# Patient Record
Sex: MALE | Race: WHITE | Employment: FULL TIME | URBAN - METROPOLITAN AREA
[De-identification: names, ages, dates, MRNs, and addresses within clinical notes are randomized per-mention and may not be internally consistent; named-entity substitution may affect disease eponyms.]

---

## 2021-03-22 ENCOUNTER — APPOINTMENT (OUTPATIENT)
Dept: CT IMAGING | Age: 36
DRG: 179 | End: 2021-03-22
Attending: EMERGENCY MEDICINE
Payer: COMMERCIAL

## 2021-03-22 ENCOUNTER — APPOINTMENT (OUTPATIENT)
Dept: GENERAL RADIOLOGY | Age: 36
DRG: 179 | End: 2021-03-22
Attending: EMERGENCY MEDICINE
Payer: COMMERCIAL

## 2021-03-22 ENCOUNTER — HOSPITAL ENCOUNTER (INPATIENT)
Age: 36
LOS: 2 days | Discharge: HOME | DRG: 179 | End: 2021-03-24
Attending: EMERGENCY MEDICINE | Admitting: HOSPITALIST
Payer: COMMERCIAL

## 2021-03-22 DIAGNOSIS — R07.9 CHEST PAIN, UNSPECIFIED TYPE: ICD-10-CM

## 2021-03-22 DIAGNOSIS — U07.1 COVID-19: ICD-10-CM

## 2021-03-22 DIAGNOSIS — R06.02 SHORTNESS OF BREATH: Primary | ICD-10-CM

## 2021-03-22 PROBLEM — R09.02 HYPOXIA: Status: ACTIVE | Noted: 2021-03-22

## 2021-03-22 PROBLEM — R06.00 DYSPNEA: Status: ACTIVE | Noted: 2021-03-22

## 2021-03-22 LAB
ALBUMIN SERPL-MCNC: 4.1 G/DL (ref 3.4–5)
ALBUMIN/GLOB SERPL: 1.3 {RATIO} (ref 0.8–1.7)
ALP SERPL-CCNC: 113 U/L (ref 45–117)
ALT SERPL-CCNC: 92 U/L (ref 16–61)
ANION GAP SERPL CALC-SCNC: 8 MMOL/L (ref 3–18)
AST SERPL-CCNC: 35 U/L (ref 10–38)
BASOPHILS # BLD: 0 K/UL (ref 0–0.1)
BASOPHILS NFR BLD: 0 % (ref 0–2)
BILIRUB SERPL-MCNC: 0.9 MG/DL (ref 0.2–1)
BUN SERPL-MCNC: 13 MG/DL (ref 7–18)
BUN/CREAT SERPL: 10 (ref 12–20)
CALCIUM SERPL-MCNC: 9.4 MG/DL (ref 8.5–10.1)
CHLORIDE SERPL-SCNC: 107 MMOL/L (ref 100–111)
CK MB CFR SERPL CALC: NORMAL % (ref 0–4)
CK MB SERPL-MCNC: <1 NG/ML (ref 5–25)
CK SERPL-CCNC: 63 U/L (ref 39–308)
CO2 SERPL-SCNC: 25 MMOL/L (ref 21–32)
CREAT SERPL-MCNC: 1.35 MG/DL (ref 0.6–1.3)
CRP SERPL-MCNC: <0.3 MG/DL (ref 0–0.3)
D DIMER PPP FEU-MCNC: <0.27 UG/ML(FEU)
DIFFERENTIAL METHOD BLD: ABNORMAL
EOSINOPHIL # BLD: 0 K/UL (ref 0–0.4)
EOSINOPHIL NFR BLD: 1 % (ref 0–5)
ERYTHROCYTE [DISTWIDTH] IN BLOOD BY AUTOMATED COUNT: 13 % (ref 11.6–14.5)
GLOBULIN SER CALC-MCNC: 3.1 G/DL (ref 2–4)
GLUCOSE SERPL-MCNC: 97 MG/DL (ref 74–99)
HCT VFR BLD AUTO: 44.2 % (ref 36–48)
HGB BLD-MCNC: 14.5 G/DL (ref 13–16)
LYMPHOCYTES # BLD: 2.1 K/UL (ref 0.9–3.6)
LYMPHOCYTES NFR BLD: 32 % (ref 21–52)
MAGNESIUM SERPL-MCNC: 2.3 MG/DL (ref 1.6–2.6)
MCH RBC QN AUTO: 28 PG (ref 24–34)
MCHC RBC AUTO-ENTMCNC: 32.8 G/DL (ref 31–37)
MCV RBC AUTO: 85.5 FL (ref 74–97)
MONOCYTES # BLD: 0.8 K/UL (ref 0.05–1.2)
MONOCYTES NFR BLD: 12 % (ref 3–10)
NEUTS SEG # BLD: 3.7 K/UL (ref 1.8–8)
NEUTS SEG NFR BLD: 55 % (ref 40–73)
PLATELET # BLD AUTO: 320 K/UL (ref 135–420)
PMV BLD AUTO: 9.7 FL (ref 9.2–11.8)
POTASSIUM SERPL-SCNC: 3.9 MMOL/L (ref 3.5–5.5)
PROCALCITONIN SERPL-MCNC: <0.05 NG/ML
PROT SERPL-MCNC: 7.2 G/DL (ref 6.4–8.2)
RBC # BLD AUTO: 5.17 M/UL (ref 4.7–5.5)
SODIUM SERPL-SCNC: 140 MMOL/L (ref 136–145)
TROPONIN I SERPL-MCNC: <0.02 NG/ML (ref 0–0.04)
WBC # BLD AUTO: 6.7 K/UL (ref 4.6–13.2)

## 2021-03-22 PROCEDURE — 71275 CT ANGIOGRAPHY CHEST: CPT

## 2021-03-22 PROCEDURE — 84145 PROCALCITONIN (PCT): CPT

## 2021-03-22 PROCEDURE — 85025 COMPLETE CBC W/AUTO DIFF WBC: CPT

## 2021-03-22 PROCEDURE — 83735 ASSAY OF MAGNESIUM: CPT

## 2021-03-22 PROCEDURE — 74011000258 HC RX REV CODE- 258: Performed by: HOSPITALIST

## 2021-03-22 PROCEDURE — 99285 EMERGENCY DEPT VISIT HI MDM: CPT

## 2021-03-22 PROCEDURE — 74011250637 HC RX REV CODE- 250/637: Performed by: HOSPITALIST

## 2021-03-22 PROCEDURE — 77010033678 HC OXYGEN DAILY

## 2021-03-22 PROCEDURE — XW033E5 INTRODUCTION OF REMDESIVIR ANTI-INFECTIVE INTO PERIPHERAL VEIN, PERCUTANEOUS APPROACH, NEW TECHNOLOGY GROUP 5: ICD-10-PCS | Performed by: HOSPITALIST

## 2021-03-22 PROCEDURE — 80053 COMPREHEN METABOLIC PANEL: CPT

## 2021-03-22 PROCEDURE — 74011250636 HC RX REV CODE- 250/636: Performed by: EMERGENCY MEDICINE

## 2021-03-22 PROCEDURE — 86140 C-REACTIVE PROTEIN: CPT

## 2021-03-22 PROCEDURE — 74011000250 HC RX REV CODE- 250: Performed by: HOSPITALIST

## 2021-03-22 PROCEDURE — 82553 CREATINE MB FRACTION: CPT

## 2021-03-22 PROCEDURE — 74011000636 HC RX REV CODE- 636: Performed by: HOSPITALIST

## 2021-03-22 PROCEDURE — 71045 X-RAY EXAM CHEST 1 VIEW: CPT

## 2021-03-22 PROCEDURE — 74011250636 HC RX REV CODE- 250/636: Performed by: HOSPITALIST

## 2021-03-22 PROCEDURE — 65660000000 HC RM CCU STEPDOWN

## 2021-03-22 PROCEDURE — 85379 FIBRIN DEGRADATION QUANT: CPT

## 2021-03-22 PROCEDURE — 93005 ELECTROCARDIOGRAM TRACING: CPT

## 2021-03-22 RX ORDER — DEXAMETHASONE 4 MG/1
6 TABLET ORAL DAILY
Status: DISCONTINUED | OUTPATIENT
Start: 2021-03-22 | End: 2021-03-24 | Stop reason: HOSPADM

## 2021-03-22 RX ORDER — ENOXAPARIN SODIUM 100 MG/ML
30 INJECTION SUBCUTANEOUS EVERY 12 HOURS
Status: DISCONTINUED | OUTPATIENT
Start: 2021-03-22 | End: 2021-03-24 | Stop reason: HOSPADM

## 2021-03-22 RX ORDER — SODIUM CHLORIDE 9 MG/ML
250 INJECTION, SOLUTION INTRAVENOUS AS NEEDED
Status: DISCONTINUED | OUTPATIENT
Start: 2021-03-22 | End: 2021-03-24 | Stop reason: HOSPADM

## 2021-03-22 RX ORDER — MELATONIN
2000 DAILY
Status: DISCONTINUED | OUTPATIENT
Start: 2021-03-22 | End: 2021-03-24 | Stop reason: HOSPADM

## 2021-03-22 RX ORDER — CALCIUM CARB/MAGNESIUM CARB 311-232MG
5 TABLET ORAL
Status: DISCONTINUED | OUTPATIENT
Start: 2021-03-22 | End: 2021-03-24 | Stop reason: HOSPADM

## 2021-03-22 RX ORDER — ZINC SULFATE 50(220)MG
1 CAPSULE ORAL DAILY
Status: DISCONTINUED | OUTPATIENT
Start: 2021-03-23 | End: 2021-03-24 | Stop reason: HOSPADM

## 2021-03-22 RX ORDER — ASCORBIC ACID 250 MG
500 TABLET ORAL DAILY
Status: DISCONTINUED | OUTPATIENT
Start: 2021-03-23 | End: 2021-03-24 | Stop reason: HOSPADM

## 2021-03-22 RX ORDER — DIPHENHYDRAMINE HYDROCHLORIDE 50 MG/ML
50 INJECTION, SOLUTION INTRAMUSCULAR; INTRAVENOUS
Status: COMPLETED | OUTPATIENT
Start: 2021-03-22 | End: 2021-03-22

## 2021-03-22 RX ADMIN — DEXAMETHASONE 6 MG: 4 TABLET ORAL at 21:29

## 2021-03-22 RX ADMIN — ENOXAPARIN SODIUM 30 MG: 30 INJECTION SUBCUTANEOUS at 21:30

## 2021-03-22 RX ADMIN — DIPHENHYDRAMINE HYDROCHLORIDE 50 MG: 50 INJECTION, SOLUTION INTRAMUSCULAR; INTRAVENOUS at 14:40

## 2021-03-22 RX ADMIN — Medication 2000 UNITS: at 21:30

## 2021-03-22 RX ADMIN — IOPAMIDOL 100 ML: 755 INJECTION, SOLUTION INTRAVENOUS at 15:46

## 2021-03-22 RX ADMIN — SODIUM CHLORIDE 1000 ML: 900 INJECTION, SOLUTION INTRAVENOUS at 14:40

## 2021-03-22 RX ADMIN — REMDESIVIR 200 MG: 100 INJECTION, POWDER, LYOPHILIZED, FOR SOLUTION INTRAVENOUS at 21:30

## 2021-03-22 RX ADMIN — Medication 5 MG: at 21:30

## 2021-03-22 RX ADMIN — METHYLPREDNISOLONE SODIUM SUCCINATE 125 MG: 125 INJECTION, POWDER, FOR SOLUTION INTRAMUSCULAR; INTRAVENOUS at 14:40

## 2021-03-22 NOTE — ED NOTES
TRANSFER - OUT REPORT:    Verbal report given to PIERCE Beaulieu (name) on Pedro Curtis  being transferred to Tele (unit) for routine progression of care       Report consisted of patient’s Situation, Background, Assessment and   Recommendations(SBAR).     Information from the following report(s) SBAR, ED Summary, MAR, Accordion, Recent Results and Cardiac Rhythm Normal Sinus Rhythm  was reviewed with the receiving nurse.    Lines:   Peripheral IV Anterior;Left Forearm (Active)   Site Assessment Clean, dry, & intact 03/22/21 1045   Phlebitis Assessment 0 03/22/21 1045   Dressing Status Clean, dry, & intact 03/22/21 1045   Hub Color/Line Status Green 03/22/21 1045       Peripheral IV 03/22/21 Right Cephalic (Active)   Site Assessment Clean, dry, & intact 03/22/21 1040   Phlebitis Assessment 0 03/22/21 1040   Infiltration Assessment 0 03/22/21 1040   Dressing Status Clean, dry, & intact 03/22/21 1040   Dressing Type Tape;Transparent 03/22/21 1040   Hub Color/Line Status Green;Capped;Flushed 03/22/21 1040   Action Taken Blood drawn 03/22/21 1040        Opportunity for questions and clarification was provided.      Patient transported with:   Monitor  O2 @ 2 liters  Registered Nurse

## 2021-03-22 NOTE — ED TRIAGE NOTES
Pt arrived via EMS for c/o SOB and chest tightness. Pt dx'd w/covid 3/9/21. EMS reported pt O2 sats at 90% upon arriva and administered an alb/atrovent tx in route. Valenitno Gamez

## 2021-03-22 NOTE — ED PROVIDER NOTES
EMERGENCY DEPARTMENT HISTORY AND PHYSICAL EXAM    Date: 3/22/2021  Patient Name: Yecenia Walsh    History of Presenting Illness     Chief Complaint   Patient presents with    Chest Pain    Shortness of Breath       History Provided By: Patient     History Khanh Fry):   11:09 AM  Yecenia Walsh is a 38 y.o. male with PMHX of coronavirus infection (9 March 2021) who presents to the emergency department C/O shortness of breath onset 2 weeks ago. Associated sxs include chest pain, fevers, chills, myalgias. Pt denies any other sxs or complaints. Patient states hives in the past to contrast dye. Chief Complaint: Shortness of breath  Duration: 2 weeks  Timing: Subacute  Location: Lungs  Quality: Pressure  Severity: Moderate  Modifying Factors: Nothing makes it better, or worse. Associated Symptoms: Chest pain    PCP: None    Past History     Past Medical History:  History reviewed. No pertinent past medical history. Past Surgical History:  Past Surgical History:   Procedure Laterality Date    HX ORTHOPAEDIC      left knee        Family History:  History reviewed. No pertinent family history. Social History:  Social History     Tobacco Use    Smoking status: Never Smoker    Smokeless tobacco: Never Used   Substance Use Topics    Alcohol use: Not Currently    Drug use: Never       Allergies: Allergies   Allergen Reactions    Iodinated Contrast Media Hives    Vicodin [Hydrocodone-Acetaminophen] Unknown (comments)     \"I feel not good\"         Review of Systems     Review of Systems   Constitutional: Positive for chills and fatigue. Negative for fever. HENT: Negative for rhinorrhea and sore throat. Eyes: Negative for pain and visual disturbance. Respiratory: Positive for cough and shortness of breath. Negative for chest tightness and wheezing. Cardiovascular: Positive for chest pain. Negative for palpitations. Gastrointestinal: Negative for abdominal pain, diarrhea, nausea and vomiting. Musculoskeletal: Negative for arthralgias and myalgias. Skin: Negative for rash and wound. Neurological: Negative for speech difficulty, light-headedness and headaches. Psychiatric/Behavioral: Negative for agitation and confusion. All other systems reviewed and are negative. Physical Exam     Vitals:    03/22/21 1115 03/22/21 1128 03/22/21 1230 03/22/21 1345   BP: 122/76  100/62 105/66   Pulse:   69 68   Resp:   29 22   Temp:       SpO2: 100% 98% 93% 100%   Weight:       Height:           Physical Exam  Vitals signs and nursing note reviewed. Constitutional:       General: He is not in acute distress. Appearance: Normal appearance. He is normal weight. He is not ill-appearing. HENT:      Head: Normocephalic and atraumatic. Nose: Nose normal. No rhinorrhea. Mouth/Throat:      Mouth: Mucous membranes are moist.      Pharynx: No oropharyngeal exudate or posterior oropharyngeal erythema. Eyes:      Extraocular Movements: Extraocular movements intact. Conjunctiva/sclera: Conjunctivae normal.      Pupils: Pupils are equal, round, and reactive to light. Neck:      Musculoskeletal: Normal range of motion and neck supple. No neck rigidity. Cardiovascular:      Rate and Rhythm: Normal rate and regular rhythm. Heart sounds: No murmur. No friction rub. No gallop. Pulmonary:      Effort: Pulmonary effort is normal. No respiratory distress. Breath sounds: Examination of the right-middle field reveals rales. Examination of the left-middle field reveals rales. Examination of the right-lower field reveals rales. Examination of the left-lower field reveals rales. Rales present. No wheezing or rhonchi. Abdominal:      General: Bowel sounds are normal.      Palpations: Abdomen is soft. Tenderness: There is no abdominal tenderness. There is no guarding or rebound. Musculoskeletal: Normal range of motion. General: No swelling, tenderness or deformity. Lymphadenopathy:      Cervical: No cervical adenopathy. Skin:     General: Skin is warm and dry. Findings: No rash. Neurological:      General: No focal deficit present. Mental Status: He is alert and oriented to person, place, and time. Psychiatric:         Mood and Affect: Mood normal.         Behavior: Behavior normal.         Diagnostic Study Results     Labs -     Recent Results (from the past 12 hour(s))   CBC WITH AUTOMATED DIFF    Collection Time: 03/22/21 10:40 AM   Result Value Ref Range    WBC 6.7 4.6 - 13.2 K/uL    RBC 5.17 4.70 - 5.50 M/uL    HGB 14.5 13.0 - 16.0 g/dL    HCT 44.2 36.0 - 48.0 %    MCV 85.5 74.0 - 97.0 FL    MCH 28.0 24.0 - 34.0 PG    MCHC 32.8 31.0 - 37.0 g/dL    RDW 13.0 11.6 - 14.5 %    PLATELET 758 551 - 723 K/uL    MPV 9.7 9.2 - 11.8 FL    NEUTROPHILS 55 40 - 73 %    LYMPHOCYTES 32 21 - 52 %    MONOCYTES 12 (H) 3 - 10 %    EOSINOPHILS 1 0 - 5 %    BASOPHILS 0 0 - 2 %    ABS. NEUTROPHILS 3.7 1.8 - 8.0 K/UL    ABS. LYMPHOCYTES 2.1 0.9 - 3.6 K/UL    ABS. MONOCYTES 0.8 0.05 - 1.2 K/UL    ABS. EOSINOPHILS 0.0 0.0 - 0.4 K/UL    ABS.  BASOPHILS 0.0 0.0 - 0.1 K/UL    DF AUTOMATED     CARDIAC PANEL,(CK, CKMB & TROPONIN)    Collection Time: 03/22/21 10:40 AM   Result Value Ref Range    CK - MB <1.0 <3.6 ng/ml    CK-MB Index  0.0 - 4.0 %     CALCULATION NOT PERFORMED WHEN RESULT IS BELOW LINEAR LIMIT    CK 63 39 - 308 U/L    Troponin-I, QT <0.02 0.0 - 2.413 NG/ML   METABOLIC PANEL, COMPREHENSIVE    Collection Time: 03/22/21 10:40 AM   Result Value Ref Range    Sodium 140 136 - 145 mmol/L    Potassium 3.9 3.5 - 5.5 mmol/L    Chloride 107 100 - 111 mmol/L    CO2 25 21 - 32 mmol/L    Anion gap 8 3.0 - 18 mmol/L    Glucose 97 74 - 99 mg/dL    BUN 13 7.0 - 18 MG/DL    Creatinine 1.35 (H) 0.6 - 1.3 MG/DL    BUN/Creatinine ratio 10 (L) 12 - 20      GFR est AA >60 >60 ml/min/1.73m2    GFR est non-AA 60 (L) >60 ml/min/1.73m2    Calcium 9.4 8.5 - 10.1 MG/DL    Bilirubin, total 0.9 0.2 - 1.0 MG/DL    ALT (SGPT) 92 (H) 16 - 61 U/L    AST (SGOT) 35 10 - 38 U/L    Alk. phosphatase 113 45 - 117 U/L    Protein, total 7.2 6.4 - 8.2 g/dL    Albumin 4.1 3.4 - 5.0 g/dL    Globulin 3.1 2.0 - 4.0 g/dL    A-G Ratio 1.3 0.8 - 1.7     MAGNESIUM    Collection Time: 03/22/21 10:40 AM   Result Value Ref Range    Magnesium 2.3 1.6 - 2.6 mg/dL   EKG, 12 LEAD, INITIAL    Collection Time: 03/22/21 10:53 AM   Result Value Ref Range    Ventricular Rate 75 BPM    Atrial Rate 75 BPM    P-R Interval 184 ms    QRS Duration 88 ms    Q-T Interval 394 ms    QTC Calculation (Bezet) 439 ms    Calculated P Axis 30 degrees    Calculated R Axis 27 degrees    Calculated T Axis 42 degrees    Diagnosis       Normal sinus rhythm  Normal ECG  No previous ECGs available         Radiologic Studies -   CTA CHEST W OR W WO CONT   Final Result      1. No evidence of pulmonary embolism. 2.  Well aerated lungs without evidence of pneumonia or pulmonary edema. 3. Left upper lobe pulmonary nodule, potential intrapulmonary lymph node given   imaging appearance. Comparison with prior CT imaging recommended if available. If no prior imaging is available, please see below guidelines for proposed   follow-up.      ========      Fleischner Society pulmonary nodule guidelines (revised 2017): Solid nodule 6-8 mm:   -Low risk for lung cancer: CT at 6-12 months, then consider CT at 18-24 months.   -High risk for lung cancer: CT at 6-12 months, then CT at 18-24 months. XR CHEST PORT   Final Result      Rotated projection of the chest without superimposed acute abnormality. CT Results  (Last 48 hours)    None        CXR Results  (Last 48 hours)               03/22/21 1137  XR CHEST PORT Final result    Impression:      Rotated projection of the chest without superimposed acute abnormality.         Narrative:  EXAM: XR CHEST PORT       CLINICAL INDICATION/HISTORY: SOB   -Additional: None       COMPARISON: None TECHNIQUE: Frontal view of the chest       _______________       FINDINGS:       HEART AND MEDIASTINUM: Cardiac size is upper limits of normal for AP technique. LUNGS AND PLEURAL SPACES: Rotated nature of the projection foreshortening the   left hemithorax. No focal pneumonic opacity. No evidence of pneumothorax or   pleural effusion. BONY THORAX AND SOFT TISSUES: No acute osseous abnormality       _______________                 Medications given in the ED-  Medications   diphenhydrAMINE (BENADRYL) injection 50 mg (has no administration in time range)   methylPREDNISolone (PF) (Solu-MEDROL) injection 125 mg (has no administration in time range)   sodium chloride 0.9 % bolus infusion 1,000 mL (has no administration in time range)         Medical Decision Making   I am the first provider for this patient. I reviewed the vital signs, available nursing notes, past medical history, past surgical history, family history and social history. Vital Signs-Reviewed the patient's vital signs. Pulse Oximetry Analysis - 99% on room air at triage; on my evaluation his room air saturations were 88% after taking several deep breaths and coughing up phlegm patient saturations improved to 100% on room air. Cardiac Monitor:  Rate: 73 bpm  Rhythm: Sinus rhythm    EKG interpretation: (Preliminary)  Rhythm: Normal sinus rhythm. Rate: 75 bpm; no STEMI  EKG read by Renuka Felix MD at 10:53 AM    Records Reviewed: Nursing Notes    Provider Notes (Medical Decision Making):   DDX: URI, pneumonia, coronavirus, unlikely PE    Procedures:  Procedures    ED Course:   11:09 AM Initial assessment performed. The patients presenting problems have been discussed, and they are in agreement with the care plan formulated and outlined with them. I have encouraged them to ask questions as they arise throughout their visit.     2:15 PM patient remains borderline hypoxic and tachypneic not suitable for discharge home, will admit to hospitalist on the Covid cohort. 2:25 PM Discussed with Dr. Tc Valentin for [address] admission, please premedicate and obtain CT.    2:37 PM Discussed with on call radiologist, with allergic reaction of hives premedications strictly not necessary however since is been given already he should wait about an hour after getting the Benadryl and Solu-Medrol before going to CT. Diagnosis and Disposition     Discussion:  38 y.o. male with 2 weeks of shortness of breath and chest pain after being diagnosed positive for coronavirus. Patient saturations are 88-92 on room air and he is requiring oxygen for comfort. Patient is also tachypneic. Do not feel patient is safe for discharge home at this time. Discussed with hospitalist for inpatient admission. Patient is pending a CT scan for pulmonary emboli at the time of admission but required premedication for prior allergic reaction to contrast.    Critical Care Time: 0 min    Core Measures:  For Hospitalized Patients:    1. Hospitalization Decision Time:  The decision to hospitalize the patient was made by Michelle Modi MD, MD at 2:15 PM on 3/22/2021    2. Aspirin: Aspirin was not given because the patient did not present with a stroke at the time of their Emergency Department evaluation    2:25 PM Patient is being admitted to the hospital by Dr. Naida Medina. The results of their tests and reasons for their admission have been discussed with them and/or available family. They convey agreement and understanding for the need to be admitted and for their admission diagnosis. CONDITIONS ON ADMISSION:  Sepsis is not present at the time of admission. Deep Vein Thrombosis is not present at the time of admission. Thrombosis is not present at the time of admission. Urinary Tract Infection is not present at the time of admission. Pneumonia is not present at the time of admission. MRSA is not present at the time of admission.  Wound infection is not present at the time of admission. Pressure Ulcer is not present at the time of admission. CLINICAL IMPRESSION:    1. Shortness of breath    2. Chest pain, unspecified type    3. [address]     Please note that this dictation was completed with [employer], the computer voice recognition software. Quite often unanticipated grammatical, syntax, homophones, and other interpretive errors are inadvertently transcribed by the computer software. Please disregard these errors. Please excuse any errors that have escaped final proofreading.     Zaira Balderas MD

## 2021-03-22 NOTE — PROGRESS NOTES
Bedside and Verbal shift change report given to PIERCE Reynolds (oncoming nurse) by Coleman Love (offgoing nurse).  Report included the following information SBAR, Kardex, Intake/Output, MAR, Recent Results

## 2021-03-22 NOTE — H&P
History & Physical    Patient: Yecenia Walsh MRN: [id number]  CSN: [id number]    YOB: 1985  Age: 38 y.o. Sex: male      DOA: 3/22/2021  Primary Care Provider:  None      Assessment/Plan     Patient Active Problem List   Diagnosis Code    Dyspnea R06.00    COVID-19 U07.1    Hypoxia R09.02       Admit to COVID 19 unit    COVID 19 infection -  With hypoxia  Started on vitamin/antioxidant cocktail  Dexamethasone  remdesivir  convaslescent plasma. lovenox 30 units bid    Estimated length of stay : 5 days    CC: SOB       HPI:     Yecenia Walsh is a 38 y.o. male with no significant past history presents to ER with concerns of SOB since March 9. He was diagnosed with COVID 19 on March 9. Occasional cough. Other symptoms include fever/chills, generalized aches, loss of taste and smell. Denies smoking  In ER he was noted to be hypoxic. CXR with no acute findings. CTA chest with no PE, no edema or pneumonia. Showed left upper lobe pulmonary nodule. History reviewed. No pertinent past medical history. Past Surgical History:   Procedure Laterality Date    HX ORTHOPAEDIC      left knee        History reviewed. No pertinent family history. Social History     Socioeconomic History    Marital status:      Spouse name: Not on file    Number of children: Not on file    Years of education: Not on file    Highest education level: Not on file   Tobacco Use    Smoking status: Never Smoker    Smokeless tobacco: Never Used   Substance and Sexual Activity    Alcohol use: Not Currently    Drug use: Never       Prior to Admission medications    Not on File       Allergies   Allergen Reactions    Iodinated Contrast Media Hives    Vicodin [Hydrocodone-Acetaminophen] Unknown (comments)     \"I feel not good\"       Review of Systems  Gen: No fever, chills, malaise, weight loss/gain. Heent: No headache, rhinorrhea, epistaxis, ear pain, hearing loss, sinus pain, neck pain/stiffness, sore throat. Heart: No chest pain, palpitations, SINGH, pnd, or orthopnea. Resp: see above   GI: No nausea, vomiting, diarrhea, constipation, melena or hematochezia. : No urinary obstruction, dysuria or hematuria. Derm: No rash, new skin lesion or pruritis. Musc/skeletal: no bone or joint complains. Vasc: No edema, cyanosis or claudication. Endo: No heat/cold intolerance, no polyuria,polydipsia or polyphagia. Neuro: No unilateral weakness, numbness, tingling. No seizures. Heme: No easy bruising or bleeding. Physical Exam:     Physical Exam:  Visit Vitals  BP 91/62 (BP 1 Location: Left upper arm, BP Patient Position: Supine)   Pulse 74   Temp 97.9 °F (36.6 °C)   Resp 18   Ht 6' (1.829 m)   Wt 97.5 kg (215 lb)   SpO2 97%   BMI 29.16 kg/m²    O2 Flow Rate (L/min): 2 l/min O2 Device: Nasal cannula    Temp (24hrs), Av.1 °F (36.7 °C), Min:97.9 °F (36.6 °C), Max:98.3 °F (36.8 °C)    No intake/output data recorded. No intake/output data recorded. General:  Awake, cooperative, no distress. Head:  Normocephalic, without obvious abnormality, atraumatic. Eyes:  Conjunctivae/corneas clear, sclera anicteric, PERRL, EOMs intact. Nose: Nares normal. No drainage or sinus tenderness. Throat: Lips, mucosa, and tongue normal.    Neck: Supple, symmetrical, trachea midline, no adenopathy. Lungs:   Clear to auscultation bilaterally. Heart:   S1, S2, no murmur, click, rub or gallop. Abdomen: Soft, non-tender. Bowel sounds normal. No masses,  No organomegaly. Extremities: Extremities normal, atraumatic, no cyanosis or edema. Capillary refill normal.   Pulses: 2+ and symmetric all extremities. Skin: Skin color pink, turgor normal. No rashes or lesions   Neurologic: CNII-XII intact. No focal motor or sensory deficit.        Labs Reviewed:    CMP:   Lab Results   Component Value Date/Time     2021 10:40 AM    K 3.9 2021 10:40 AM     2021 10:40 AM    CO2 25 03/22/2021 10:40 AM    AGAP 8 03/22/2021 10:40 AM    GLU 97 03/22/2021 10:40 AM    BUN 13 03/22/2021 10:40 AM    CREA 1.35 (H) 03/22/2021 10:40 AM    GFRAA >60 03/22/2021 10:40 AM    GFRNA 60 (L) 03/22/2021 10:40 AM    CA 9.4 03/22/2021 10:40 AM    MG 2.3 03/22/2021 10:40 AM    ALB 4.1 03/22/2021 10:40 AM    TP 7.2 03/22/2021 10:40 AM    GLOB 3.1 03/22/2021 10:40 AM    AGRAT 1.3 03/22/2021 10:40 AM    ALT 92 (H) 03/22/2021 10:40 AM     CBC:   Lab Results   Component Value Date/Time    WBC 6.7 03/22/2021 10:40 AM    HGB 14.5 03/22/2021 10:40 AM    HCT 44.2 03/22/2021 10:40 AM     03/22/2021 10:40 AM     All Cardiac Markers in the last 24 hours:   Lab Results   Component Value Date/Time    CPK 63 03/22/2021 10:40 AM    CKMB <1.0 03/22/2021 10:40 AM    CKND1  03/22/2021 10:40 AM     CALCULATION NOT PERFORMED WHEN RESULT IS BELOW LINEAR LIMIT    Bel Blew <0.02 03/22/2021 10:40 AM         Procedures/imaging: see electronic medical records for all procedures/Xrays and details which were not copied into this note but were reviewed prior to creation of Plan    Please note that this dictation was completed with [employer], the [employer] voice recognition software. Quite often unanticipated grammatical, syntax, homophones, and other interpretive errors are inadvertently transcribed by the computer software. Please disregard these errors. Please excuse any errors that have escaped final proofreading.         CC: None

## 2021-03-22 NOTE — PROGRESS NOTES
Reason for Admission:  C/o SOB and CP                     RUR Score:    6                 Plan for utilizing home health:      TBD    PCP: First and Last name:  None     Name of Practice:    Are you a current patient: Yes/No:    Approximate date of last visit:    Can you participate in a virtual visit with your PCP:                     Current Advanced Directive/Advance Care Plan: No Order      Healthcare Decision Maker:   Click here to complete [employer] including selection of the Healthcare Decision Maker Relationship (ie \"Primary\")                             Transition of Care Plan:  Chart reviewed  Noted per ED note pt arrived to ED with c/o SOB, reported being diagnosed on 3-9-21 with covid, pt placed on 2L NC, pt placed on droplet isolation, at this time pt remains in ICU waiting on available tele bed, unit cm will review case for d/c planning.

## 2021-03-23 LAB
25(OH)D3 SERPL-MCNC: 15.8 NG/ML (ref 30–100)
ABO + RH BLD: NORMAL
ALBUMIN SERPL-MCNC: 3.7 G/DL (ref 3.4–5)
ALBUMIN/GLOB SERPL: 1.2 {RATIO} (ref 0.8–1.7)
ALP SERPL-CCNC: 104 U/L (ref 45–117)
ALT SERPL-CCNC: 72 U/L (ref 16–61)
ANION GAP SERPL CALC-SCNC: 5 MMOL/L (ref 3–18)
AST SERPL-CCNC: 20 U/L (ref 10–38)
ATRIAL RATE: 75 BPM
BASOPHILS # BLD: 0 K/UL (ref 0–0.1)
BASOPHILS NFR BLD: 0 % (ref 0–2)
BILIRUB SERPL-MCNC: 0.6 MG/DL (ref 0.2–1)
BLOOD GROUP ANTIBODIES SERPL: NORMAL
BUN SERPL-MCNC: 16 MG/DL (ref 7–18)
BUN/CREAT SERPL: 14 (ref 12–20)
CALCIUM SERPL-MCNC: 9.2 MG/DL (ref 8.5–10.1)
CALCULATED P AXIS, ECG09: 30 DEGREES
CALCULATED R AXIS, ECG10: 27 DEGREES
CALCULATED T AXIS, ECG11: 42 DEGREES
CHLORIDE SERPL-SCNC: 113 MMOL/L (ref 100–111)
CO2 SERPL-SCNC: 24 MMOL/L (ref 21–32)
CREAT SERPL-MCNC: 1.18 MG/DL (ref 0.6–1.3)
CRP SERPL-MCNC: 0.3 MG/DL (ref 0–0.3)
D DIMER PPP FEU-MCNC: 0.29 UG/ML(FEU)
DIAGNOSIS, 93000: NORMAL
DIFFERENTIAL METHOD BLD: ABNORMAL
EOSINOPHIL # BLD: 0 K/UL (ref 0–0.4)
EOSINOPHIL NFR BLD: 0 % (ref 0–5)
ERYTHROCYTE [DISTWIDTH] IN BLOOD BY AUTOMATED COUNT: 13.5 % (ref 11.6–14.5)
FERRITIN SERPL-MCNC: 108 NG/ML (ref 8–388)
FIBRINOGEN PPP-MCNC: 313 MG/DL (ref 210–451)
GLOBULIN SER CALC-MCNC: 3.2 G/DL (ref 2–4)
GLUCOSE SERPL-MCNC: 135 MG/DL (ref 74–99)
HCT VFR BLD AUTO: 43.1 % (ref 36–48)
HGB BLD-MCNC: 13.7 G/DL (ref 13–16)
LYMPHOCYTES # BLD: 0.8 K/UL (ref 0.9–3.6)
LYMPHOCYTES NFR BLD: 7 % (ref 21–52)
MCH RBC QN AUTO: 27.6 PG (ref 24–34)
MCHC RBC AUTO-ENTMCNC: 31.8 G/DL (ref 31–37)
MCV RBC AUTO: 86.7 FL (ref 74–97)
MONOCYTES # BLD: 0.3 K/UL (ref 0.05–1.2)
MONOCYTES NFR BLD: 3 % (ref 3–10)
NEUTS SEG # BLD: 10.8 K/UL (ref 1.8–8)
NEUTS SEG NFR BLD: 90 % (ref 40–73)
P-R INTERVAL, ECG05: 184 MS
PLATELET # BLD AUTO: 345 K/UL (ref 135–420)
PMV BLD AUTO: 10.1 FL (ref 9.2–11.8)
POTASSIUM SERPL-SCNC: 4.6 MMOL/L (ref 3.5–5.5)
PROCALCITONIN SERPL-MCNC: <0.05 NG/ML
PROT SERPL-MCNC: 6.9 G/DL (ref 6.4–8.2)
Q-T INTERVAL, ECG07: 394 MS
QRS DURATION, ECG06: 88 MS
QTC CALCULATION (BEZET), ECG08: 439 MS
RBC # BLD AUTO: 4.97 M/UL (ref 4.7–5.5)
SODIUM SERPL-SCNC: 142 MMOL/L (ref 136–145)
SPECIMEN EXP DATE BLD: NORMAL
VENTRICULAR RATE, ECG03: 75 BPM
WBC # BLD AUTO: 11.9 K/UL (ref 4.6–13.2)

## 2021-03-23 PROCEDURE — 82306 VITAMIN D 25 HYDROXY: CPT

## 2021-03-23 PROCEDURE — 85384 FIBRINOGEN ACTIVITY: CPT

## 2021-03-23 PROCEDURE — 65660000000 HC RM CCU STEPDOWN

## 2021-03-23 PROCEDURE — 86140 C-REACTIVE PROTEIN: CPT

## 2021-03-23 PROCEDURE — 74011000250 HC RX REV CODE- 250: Performed by: HOSPITALIST

## 2021-03-23 PROCEDURE — 80053 COMPREHEN METABOLIC PANEL: CPT

## 2021-03-23 PROCEDURE — 77010033678 HC OXYGEN DAILY

## 2021-03-23 PROCEDURE — 74011000258 HC RX REV CODE- 258: Performed by: HOSPITALIST

## 2021-03-23 PROCEDURE — 74011250637 HC RX REV CODE- 250/637: Performed by: HOSPITALIST

## 2021-03-23 PROCEDURE — 85025 COMPLETE CBC W/AUTO DIFF WBC: CPT

## 2021-03-23 PROCEDURE — 82728 ASSAY OF FERRITIN: CPT

## 2021-03-23 PROCEDURE — 86901 BLOOD TYPING SEROLOGIC RH(D): CPT

## 2021-03-23 PROCEDURE — 84145 PROCALCITONIN (PCT): CPT

## 2021-03-23 PROCEDURE — XW13325 TRANSFUSION OF CONVALESCENT PLASMA (NONAUTOLOGOUS) INTO PERIPHERAL VEIN, PERCUTANEOUS APPROACH, NEW TECHNOLOGY GROUP 5: ICD-10-PCS | Performed by: HOSPITALIST

## 2021-03-23 PROCEDURE — 36415 COLL VENOUS BLD VENIPUNCTURE: CPT

## 2021-03-23 PROCEDURE — 36430 TRANSFUSION BLD/BLD COMPNT: CPT

## 2021-03-23 PROCEDURE — 85379 FIBRIN DEGRADATION QUANT: CPT

## 2021-03-23 PROCEDURE — 74011250636 HC RX REV CODE- 250/636: Performed by: HOSPITALIST

## 2021-03-23 RX ADMIN — Medication 2000 UNITS: at 08:46

## 2021-03-23 RX ADMIN — Medication 5 MG: at 21:22

## 2021-03-23 RX ADMIN — Medication 250 MG: at 08:47

## 2021-03-23 RX ADMIN — ENOXAPARIN SODIUM 30 MG: 30 INJECTION SUBCUTANEOUS at 06:06

## 2021-03-23 RX ADMIN — DEXAMETHASONE 6 MG: 4 TABLET ORAL at 08:47

## 2021-03-23 RX ADMIN — REMDESIVIR 100 MG: 100 INJECTION, POWDER, LYOPHILIZED, FOR SOLUTION INTRAVENOUS at 21:22

## 2021-03-23 RX ADMIN — ZINC SULFATE 220 MG (50 MG) CAPSULE 1 CAPSULE: CAPSULE at 08:47

## 2021-03-23 RX ADMIN — ENOXAPARIN SODIUM 30 MG: 30 INJECTION SUBCUTANEOUS at 20:25

## 2021-03-23 NOTE — ROUTINE PROCESS
[id number] 
Bedside shift change report given to [id number] [place], [place] [address]jasmin Gonzales (oncoming nurse) by A Chong RN (offgoing nurse). Report included the following information SBAR, Kardex, Intake/Output and MAR.

## 2021-03-23 NOTE — PROGRESS NOTES
Reason for Admission:  SOB                     RUR Score:   Low; 8%                  Plan for utilizing home health:     Unlikely      PCP: First and Last name:  None     Name of Practice:    Are you a current patient: Yes/No:    Approximate date of last visit:    Can you participate in a virtual visit with your PCP:                     Current Advanced Directive/Advance Care Plan: Full Code      Healthcare Decision Maker:   Click here to complete [address] including selection of the Healthcare Decision Maker Relationship (ie \"Primary\")             Primary Decision Maker: Crecencio Lesches - Mother - [phone]                  Transition of Care Plan:    Home with physician follow up                   Chart reviewed. Per H&P \"Pedro Ruiz is a 38 y.o. male with no significant past history presents to ER with concerns of SOB since March 9. He was diagnosed with COVID 19 on March 9. Occasional cough. Other symptoms include fever/chills, generalized aches, loss of taste and smell. Denies smoking  In ER he was noted to be hypoxic. CXR with no acute findings. CTA chest with no PE, no edema or pneumonia. Showed left upper lobe pulmonary nodule. \"    CM spoke with pt to discuss transition of care. Pt is independent. Pt's brother and his 15year old daughter both live with the pt. Pt has indicated he does have insurance, however, he has lost his wallet and does not have his insurance card. CM offered assistance with a PCP as pt does not have a PCP. Pt has indicated he just moved back to the area and would like to look in to it. CM offered to provide a couple of options in his AVS to assist him with a PCP. Pt is in agreement and would prefer to research different PCPs and he will make his follow up appointments. CMS has been notified to assist.  CM to continue to follow and assist as needed. Care Management Interventions  Mode of Transport at Discharge:  Other (see comment)(Family)  Transition of Care Consult (CM Consult): Discharge Planning  Health Maintenance Reviewed: Yes  Current Support Network: Own Home(pt's adult brother and 15 yeare old daughter live with the pt)  Confirm Follow Up Transport: Self  The Plan for Transition of Care is Related to the Following Treatment Goals : Home with physician follow up   Discharge Location  Discharge Placement: Home with family assistance

## 2021-03-23 NOTE — ROUTINE PROCESS
Bedside and Verbal shift change report given to David Ross  (oncoming nurse) by Dami Wells RN  (offgoing nurse). Report given with SBAR, Kardex, Intake/Output and Recent Results.

## 2021-03-23 NOTE — PROGRESS NOTES
0840  Patient on room air. A&Ox4. Denies any pain. C/o chest tightness with exertion but states it improves with deep breathing/rest.     1800  Patient decided to stay overnight. 1925  Bedside and verbal shift change report given to Aga Ovalles by Deborah Downs RN. Report included SBAR, kardex, MAR, and recent results.

## 2021-03-23 NOTE — PROGRESS NOTES
[id number] Shift Summary: Pt rested well overnight with no complaints. No new clinical concerns noted. He remained on RA throughout the shift with no difficulties.      Nightshift Chart Audit Completed

## 2021-03-24 VITALS
BODY MASS INDEX: 29.12 KG/M2 | HEIGHT: 72 IN | RESPIRATION RATE: 16 BRPM | HEART RATE: 82 BPM | SYSTOLIC BLOOD PRESSURE: 111 MMHG | WEIGHT: 215 LBS | DIASTOLIC BLOOD PRESSURE: 63 MMHG | OXYGEN SATURATION: 98 % | TEMPERATURE: 98.4 F

## 2021-03-24 PROBLEM — R91.1 PULMONARY NODULE: Status: ACTIVE | Noted: 2021-03-24

## 2021-03-24 LAB
ALBUMIN SERPL-MCNC: 3.5 G/DL (ref 3.4–5)
ALBUMIN/GLOB SERPL: 1.3 {RATIO} (ref 0.8–1.7)
ALP SERPL-CCNC: 96 U/L (ref 45–117)
ALT SERPL-CCNC: 61 U/L (ref 16–61)
ANION GAP SERPL CALC-SCNC: 5 MMOL/L (ref 3–18)
AST SERPL-CCNC: 18 U/L (ref 10–38)
BASOPHILS # BLD: 0 K/UL (ref 0–0.1)
BASOPHILS NFR BLD: 0 % (ref 0–2)
BILIRUB SERPL-MCNC: 0.4 MG/DL (ref 0.2–1)
BLD PROD TYP BPU: NORMAL
BPU ID: NORMAL
BUN SERPL-MCNC: 17 MG/DL (ref 7–18)
BUN/CREAT SERPL: 14 (ref 12–20)
CALCIUM SERPL-MCNC: 8.5 MG/DL (ref 8.5–10.1)
CHLORIDE SERPL-SCNC: 112 MMOL/L (ref 100–111)
CO2 SERPL-SCNC: 26 MMOL/L (ref 21–32)
CREAT SERPL-MCNC: 1.24 MG/DL (ref 0.6–1.3)
CRP SERPL-MCNC: <0.3 MG/DL (ref 0–0.3)
D DIMER PPP FEU-MCNC: 0.3 UG/ML(FEU)
DIFFERENTIAL METHOD BLD: ABNORMAL
EOSINOPHIL # BLD: 0 K/UL (ref 0–0.4)
EOSINOPHIL NFR BLD: 0 % (ref 0–5)
ERYTHROCYTE [DISTWIDTH] IN BLOOD BY AUTOMATED COUNT: 13.6 % (ref 11.6–14.5)
GLOBULIN SER CALC-MCNC: 2.7 G/DL (ref 2–4)
GLUCOSE SERPL-MCNC: 113 MG/DL (ref 74–99)
HCT VFR BLD AUTO: 38.8 % (ref 36–48)
HGB BLD-MCNC: 12.5 G/DL (ref 13–16)
LYMPHOCYTES # BLD: 1.4 K/UL (ref 0.9–3.6)
LYMPHOCYTES NFR BLD: 10 % (ref 21–52)
MCH RBC QN AUTO: 28.2 PG (ref 24–34)
MCHC RBC AUTO-ENTMCNC: 32.2 G/DL (ref 31–37)
MCV RBC AUTO: 87.4 FL (ref 74–97)
MONOCYTES # BLD: 1.2 K/UL (ref 0.05–1.2)
MONOCYTES NFR BLD: 8 % (ref 3–10)
NEUTS SEG # BLD: 11.3 K/UL (ref 1.8–8)
NEUTS SEG NFR BLD: 82 % (ref 40–73)
PLATELET # BLD AUTO: 314 K/UL (ref 135–420)
PMV BLD AUTO: 9.8 FL (ref 9.2–11.8)
POTASSIUM SERPL-SCNC: 4.3 MMOL/L (ref 3.5–5.5)
PROT SERPL-MCNC: 6.2 G/DL (ref 6.4–8.2)
RBC # BLD AUTO: 4.44 M/UL (ref 4.7–5.5)
SODIUM SERPL-SCNC: 143 MMOL/L (ref 136–145)
STATUS OF UNIT,%ST: NORMAL
UNIT DIVISION, %UDIV: 0
WBC # BLD AUTO: 13.9 K/UL (ref 4.6–13.2)

## 2021-03-24 PROCEDURE — 74011250637 HC RX REV CODE- 250/637: Performed by: HOSPITALIST

## 2021-03-24 PROCEDURE — 86140 C-REACTIVE PROTEIN: CPT

## 2021-03-24 PROCEDURE — 85025 COMPLETE CBC W/AUTO DIFF WBC: CPT

## 2021-03-24 PROCEDURE — 80053 COMPREHEN METABOLIC PANEL: CPT

## 2021-03-24 PROCEDURE — 74011250636 HC RX REV CODE- 250/636: Performed by: HOSPITALIST

## 2021-03-24 PROCEDURE — 85379 FIBRIN DEGRADATION QUANT: CPT

## 2021-03-24 PROCEDURE — 36415 COLL VENOUS BLD VENIPUNCTURE: CPT

## 2021-03-24 RX ORDER — DEXAMETHASONE 6 MG/1
6 TABLET ORAL
Qty: 7 TAB | Refills: 0 | Status: SHIPPED | OUTPATIENT
Start: 2021-03-24 | End: 2021-03-31

## 2021-03-24 RX ADMIN — DEXAMETHASONE 6 MG: 4 TABLET ORAL at 09:55

## 2021-03-24 RX ADMIN — ZINC SULFATE 220 MG (50 MG) CAPSULE 1 CAPSULE: CAPSULE at 09:55

## 2021-03-24 RX ADMIN — Medication 500 MG: at 09:55

## 2021-03-24 RX ADMIN — Medication 2000 UNITS: at 09:55

## 2021-03-24 RX ADMIN — ENOXAPARIN SODIUM 30 MG: 30 INJECTION SUBCUTANEOUS at 09:54

## 2021-03-24 NOTE — DISCHARGE INSTRUCTIONS
Patient Education     Patient Education        Jan [facility] 26 After Treatment for COVID-19: Care Instructions  Overview     You are being sent home from the hospital after being treated for COVID-19. Being in the hospital can be hard, especially if you've been in the intensive care unit (ICU). Even though you're going home, you probably don't feel well yet. Healing from COVID-19 takes time. You may feel very tired for weeks or months afterward, especially if you were on a ventilator. It will take time to get back to your old level of activity. Some people may have long-lasting health problems. But most people can look forward to feeling a little better every day. If you were on a ventilator, your throat may be sore and your voice hoarse or raspy for a while. After leaving the hospital, some people have feelings of anxiety and depression. They may have nightmares. Or in their mind they may relive events that happened in the hospital (flashbacks). You can always reach out to your doctor if you're having trouble with these symptoms. Your doctor will tell you if you need to isolate yourself at home, and when you can end isolation. Follow-up care is a key part of your treatment and safety. Be sure to make and go to all appointments, and call your doctor if you are having problems. It's also a good idea to know your test results and keep a list of the medicines you take. How can you care for yourself at home? · Get plenty of rest. It can help you feel better. · Be kind to yourself if it's taking longer than you expected to feel better. You've been through a stressful time. · Get up and walk around every hour or two while you're resting. Slowly increase your activity as you start to feel better. · Eat healthy foods. · Drink plenty of fluids. If you have kidney, heart, or liver disease and have to limit fluids, talk with your doctor before you increase the amount of fluids you drink.   · Take acetaminophen (such as Tylenol) to reduce a fever. It may also help with muscle aches. Read and follow all instructions on the label. If you are in isolation after you get home  · Wear a cloth face cover when you are around other people. It can help stop the spread of the virus when you cough or sneeze. · Limit contact with people in your home. If possible, stay in a separate bedroom and use a separate bathroom. · If you have to leave home, avoid crowds and try to stay at least 6 feet away from other people. · Avoid contact with pets and other animals. · Cover your mouth and nose with a tissue when you cough or sneeze. Then throw it in the trash right away. · Wash your hands often, especially after you cough or sneeze. Use soap and water, and scrub for at least 20 seconds. If soap and water aren't available, use an alcohol-based hand . · Don't share personal household items. These include bedding, towels, cups and glasses, and eating utensils. · [address] in the warmest water allowed for the fabric type, and dry it completely. It's okay to wash other people's laundry with yours. · Clean and disinfect your home every day. Use household  and disinfectant wipes or sprays. Take special care to clean things that you grab with your hands. These include doorknobs, remote controls, phones, and handles on your refrigerator and microwave. And don't forget countertops, tabletops, bathrooms, and computer keyboards. When should you call for help? Call 911 anytime you think you may need emergency care. For example, call if you have life-threatening symptoms, such as:    · You have severe trouble breathing. (You can't talk at all.)     · You have constant chest pain or pressure.     · You are severely dizzy or lightheaded.     · You are confused or can't think clearly.     · Your face and lips have a blue color.     · You passed out (lost consciousness) or are very hard to wake up.    Call your doctor now or seek immediate medical care if:    · You have moderate trouble breathing. (You can't speak a full sentence.)     · You are coughing up blood (more than about 1 teaspoon).     · You have signs of low blood pressure. These include feeling lightheaded; being too weak to stand; and having cold, pale, clammy skin. Watch closely for changes in your health, and be sure to contact your doctor if:    · Your symptoms get worse.     · You are not getting better as expected.     · You have new or worse symptoms of anxiety, depression, nightmares, or flashbacks. Call before you go to the doctor's office. Follow their instructions. And wear a cloth face cover. Current as of: December 18, 2020               Content Version: 12.7  © 2006-2021 [employer]. Care instructions adapted under license by [employer] (which disclaims liability or warranty for this information). If you have questions about a medical condition or this instruction, always ask your healthcare professional. [place] [address] any warranty or liability for your use of this information. Learning About Coronavirus [phone])  What is coronavirus (COVID-19)? COVID-19 is a disease caused by a new type of coronavirus. This illness was first found in December 2019. It has since spread worldwide. Coronaviruses are a large group of viruses. They cause the common cold. They also cause more serious illnesses like [place] respiratory syndrome (MERS) and severe acute respiratory syndrome (SARS). COVID-19 is caused by a novel coronavirus. That means it's a new type that has not been seen in people before. What are the symptoms? Coronavirus (COVID-19) symptoms may include:  · Fever. · Cough. · Trouble breathing. · Chills or repeated shaking with chills. · Muscle pain. · Headache. · Sore throat. · New loss of taste or smell. · Vomiting. · Diarrhea.   In severe cases, COVID-19 can cause pneumonia and make it hard to breathe without help from a machine. It can cause death. How is it diagnosed? COVID-19 is diagnosed with a viral test. This may also be called a PCR test or antigen test. It looks for evidence of the virus in your breathing passages or lungs (respiratory system). The test is most often done on a sample from the nose, throat, or lungs. It's sometimes done on a sample of saliva. One way a sample is collected is by putting a long swab into the back of your nose. How is it treated? Mild cases of COVID-19 can be treated at home. Serious cases need treatment in the hospital. Treatment may include medicines to reduce symptoms, plus breathing support such as oxygen therapy or a ventilator. Some people may be placed on their belly to help their oxygen levels. Treatments that may help people who have COVID-19 include:  Antiviral medicines. These medicines treat viral infections. Remdesivir is an example. Immune-based therapy. These medicines help the immune system fight COVID-19. One example is bamlanivimab. It's a monoclonal antibody. Blood thinners. These medicines help prevent blood clots. People with severe illness are at risk for blood clots. How can you protect yourself and others? The best way to protect yourself from getting sick is to:  · Avoid areas where there is an outbreak. · Avoid contact with people who may be infected. · Avoid crowds and try to stay at least 6 feet away from other people. · Wash your hands often, especially after you cough or sneeze. Use soap and water, and scrub for at least 20 seconds. If soap and water aren't available, use an alcohol-based hand . · Avoid touching your mouth, nose, and eyes. To help avoid spreading the virus to others:  · Stay home if you are sick or have been exposed to the virus. Don't go to school, work, or public areas. And don't use public transportation, ride-shares, or taxis unless you have no choice.   · Wear a cloth face cover if you have to go to public areas. · Cover your mouth with a tissue when you cough or sneeze. Then throw the tissue in the trash and wash your hands right away. · If you're sick:  ? Leave your home only if you need to get medical care. But call the doctor's office first so they know you're coming. And wear a face cover. ? Wear the face cover whenever you're around other people. It can help stop the spread of the virus when you cough or sneeze. ? Limit contact with pets and people in your home. If possible, stay in a separate bedroom and use a separate bathroom. ? Clean and disinfect your home every day. Use household  and disinfectant wipes or sprays. Take special care to clean things that you grab with your hands. These include doorknobs, remote controls, phones, and handles on your refrigerator and microwave. And don't forget countertops, tabletops, bathrooms, and computer keyboards. When should you call for help? Call 911 anytime you think you may need emergency care. For example, call if you have life-threatening symptoms, such as:    · You have severe trouble breathing. (You can't talk at all.)     · You have constant chest pain or pressure.     · You are severely dizzy or lightheaded.     · You are confused or can't think clearly.     · Your face and lips have a blue color.     · You pass out (lose consciousness) or are very hard to wake up. Call your doctor now or seek immediate medical care if:    · You have moderate trouble breathing. (You can't speak a full sentence.)     · You are coughing up blood (more than about 1 teaspoon).     · You have signs of low blood pressure. These include feeling lightheaded; being too weak to stand; and having cold, pale, clammy skin. Watch closely for changes in your health, and be sure to contact your doctor if:    · Your symptoms get worse.     · You are not getting better as expected. Call before you go to the doctor's office.   Follow their instructions. And wear a cloth face cover. Current as of: December 18, 2020               Content Version: 12.7  © 2006-2021 [employer], [facility]. Care instructions adapted under license by [employer] (which disclaims liability or warranty for this information). If you have questions about a medical condition or this instruction, always ask your healthcare professional. [place] [address] any warranty or liability for your use of this information.

## 2021-03-24 NOTE — PROGRESS NOTES
9959- Patient in bed at this time. A/O x 4. IV to right cephalic  intact and patent. Lovenox is DVT prophylaxis   Patient denies numbness/tingling. Pedal pulses palpable. Lungs clear, diminished on RA. Bowel sounds active to all quadrants, LBM 3/23. Pain 0/10.     1420-Patient states the doctor told him he would be going home today. Patient states his ride will be here in 30 minutes. This nurse informed the patient he does not yet have a discharge order, he says we have 30 minutes to get it done or he is \"out of here. \"     1422-Paged Dr. Margarito Pacheco. 1506-Discharge instructions reviewed with patient at this time. Opportunity for questions and clarification was provided. Patient has verbalized understanding. Patient was provided with care notes to include side effects of RX's. Arm bands removed and shredded. AVS reviewed with Marshal Kirby. PIERCE.      IV removed.

## 2021-03-24 NOTE — PROGRESS NOTES
4805 - Bedside report received from Ruel Caballero 10. Patient in bed at this time. Pain 0/10. Plan of care for the day addressed with the patient.

## 2021-03-24 NOTE — PROGRESS NOTES
1925: Verbal report received from Neris Villdea RN. Assumed care of pt at this time. 2045: Patient asking if he can shower. Approved by Dr. Minnie Segal. Orders given. 0720: Shift change report given to Jossue Landaverde RN (oncoming nurse) by Juliana Mills (offgoing nurse). Report included the following information SBAR, Kardex and MAR. Shift Summary: Patient received plasma during the shift. Tolerated well. Up ad jad. Remains on room air. No SOB.

## 2021-03-24 NOTE — PROGRESS NOTES
Hospitalist Progress Note    Patient: Luis Tijerina MRN: [id number]  CSN: [id number]    YOB: 1985  Age: 38 y.o. Sex: male    DOA: 3/22/2021 LOS:  LOS: 1 day                Assessment/Plan     Patient Active Problem List   Diagnosis Code    Dyspnea R06.00    COVID-19 U07.1    Hypoxia R09.02            38 y.o. male with no significant past history presents to ER with concerns of SOB since March 9. He was diagnosed with COVID 19 on March 9.     Off oxygen    COVID 19 infection -  With hypoxia  Started on vitamin/antioxidant cocktail  Dexamethasone  remdesivir  convaslescent plasma. lovenox 30 units bid    Disposition : 4 days    Review of systems  General: No fevers or chills. Cardiovascular: No chest pain or pressure. No palpitations. Pulmonary: No shortness of breath. Gastrointestinal: No nausea, vomiting. Physical Exam:  General: Awake, cooperative, no acute distress    HEENT: NC, Atraumatic. PERRLA, anicteric sclerae. Lungs: CTA Bilaterally. No Wheezing/Rhonchi/Rales. Heart:  S1 S2,  No murmur, No Rubs, No Gallops  Abdomen: Soft, Non distended, Non tender.  +Bowel sounds,   Extremities: No c/c/e  Psych:   Not anxious or agitated. Neurologic:  No acute neurological deficit. Vital signs/Intake and Output:  Visit Vitals  /77   Pulse 96   Temp 98.6 °F (37 °C)   Resp 20   Ht 6' (1.829 m)   Wt 97.5 kg (215 lb)   SpO2 98%   BMI 29.16 kg/m²     Current Shift:  No intake/output data recorded. Last three shifts:  No intake/output data recorded.             Labs: Results:       Chemistry Recent Labs     03/23/21  0830 03/22/21  1040   * 97    140   K 4.6 3.9   * 107   CO2 24 25   BUN 16 13   CREA 1.18 1.35*   CA 9.2 9.4   AGAP 5 8   BUCR 14 10*    113   TP 6.9 7.2   ALB 3.7 4.1   GLOB 3.2 3.1   AGRAT 1.2 1.3      CBC w/Diff Recent Labs     03/23/21  0830 03/22/21  1040   WBC 11.9 6.7   RBC 4.97 5.17   HGB 13.7 14.5   HCT 43.1 44.2    320 GRANS 90* 55   LYMPH 7* 32   EOS 0 1      Cardiac Enzymes Recent Labs     03/22/21  1040   CPK 63   CKND1 CALCULATION NOT PERFORMED WHEN RESULT IS BELOW LINEAR LIMIT      Coagulation No results for input(s): PTP, INR, APTT, INREXT in the last 72 hours. Lipid Panel No results found for: CHOL, CHOLPOCT, CHOLX, CHLST, CHOLV, 122314, HDL, HDLP, LDL, LDLC, DLDLP, 899755, VLDLC, VLDL, TGLX, TRIGL, TRIGP, TGLPOCT, CHHD, CHHDX   BNP No results for input(s): BNPP in the last 72 hours.    Liver Enzymes Recent Labs     03/23/21  0830   TP 6.9   ALB 3.7         Thyroid Studies No results found for: T4, T3U, TSH, TSHEXT     Procedures/imaging: see electronic medical records for all procedures/Xrays and details which were not copied into this note but were reviewed prior to creation of Plan

## 2021-03-24 NOTE — PROGRESS NOTES
conducted an initial consultation and Spiritual Assessment for Mick Walker, who is a 38 y.o.,male. Patients Primary Language is: [place]. According to the patients EMR [employer] Affiliation is: No Rastafarian. The reason the Patient came to the hospital is:   Patient Active Problem List    Diagnosis Date Noted    Dyspnea 03/22/2021    COVID-19 03/22/2021    Hypoxia 03/22/2021        The  provided the following Interventions:  Initiated a relationship of care and support through phone conversation with patient. Explored issues of deb, belief, spirituality and Rastafarian/ritual needs while hospitalized. Listened empathically. Provided information about Spiritual Care Services. Offered assurance of prayer on patient's behalf. Chart reviewed. Assessment:  Patient does not have any Rastafarian/cultural needs that will affect patients preferences in health care. Plan:  Chaplains will continue to follow and will provide pastoral care on an as needed/requested basis.  recommends bedside caregivers page  on duty if patient shows signs of acute spiritual or emotional distress. Rev.  [address] [facility]  [phone]

## 2021-03-25 ENCOUNTER — PATIENT OUTREACH (OUTPATIENT)
Dept: CASE MANAGEMENT | Age: 36
End: 2021-03-25

## 2021-03-25 NOTE — PROGRESS NOTES
Care Transitions Nurse ( CTN) attempted to contact patient via telephone call regarding recent hospital discharge and Covid-19 educational information. There was no response and no option to leave a voicemail message at this time.

## 2021-03-25 NOTE — DISCHARGE SUMMARY
Discharge Summary    Patient: Burgess Garcia MRN: [id number]  CSN: [id number]    YOB: 1985  Age: 38 y.o. Sex: male    DOA: 3/22/2021 LOS:  LOS: 2 days   Discharge Date: 3/24/2021     Primary Care Provider:  None    Admission Diagnoses: COVID-19 [U07.1]  Chest pain [R07.9]  Dyspnea [R06.00]    Discharge Diagnoses:    Problem List as of 3/24/2021 Never Reviewed          Codes Class Noted - Resolved    Pulmonary nodule ICD-10-CM: R91.1  ICD-9-CM: 793.11  3/24/2021 - Present    Overview Signed 3/24/2021  8:26 PM by Josefina Guillaume MD     6mm             Dyspnea ICD-10-CM: R06.00  ICD-9-CM: 786.09  3/22/2021 - Present        * (Principal) COVID-19 ICD-10-CM: U07.1  ICD-9-CM: 079.89  3/22/2021 - Present        Hypoxia ICD-10-CM: R09.02  ICD-9-CM: 799.02  3/22/2021 - Present              Discharge Medications:     Discharge Medication List as of 3/24/2021  2:40 PM          Discharge Condition: Good    Procedures : None    Consults: None      PHYSICAL EXAM   Visit Vitals  /63   Pulse 82   Temp 98.4 °F (36.9 °C)   Resp 16   Ht 6' (1.829 m)   Wt 97.5 kg (215 lb)   SpO2 98%   BMI 29.16 kg/m²     General: Awake, cooperative, no acute distress    HEENT: NC, Atraumatic. PERRLA, EOMI. Anicteric sclerae. Lungs:  CTA Bilaterally. No Wheezing/Rhonchi/Rales. Heart:  Regular  rhythm,  No murmur, No Rubs, No Gallops  Abdomen: Soft, Non distended, Non tender. +Bowel sounds,   Extremities: No c/c/e  Psych:   Not anxious or agitated. Neurologic:  No acute neurological deficits. Admission HPI :   Burgess Garcia is a 38 y.o. male with no significant past history presents to ER with concerns of SOB since March 9. He was diagnosed with COVID 19 on March 9. Occasional cough. Other symptoms include fever/chills, generalized aches, loss of taste and smell. Denies smoking  In ER he was noted to be hypoxic. CXR with no acute findings. CTA chest with no PE, no edema or pneumonia. Showed left upper lobe pulmonary nodule. Hospital Course :   Mr. Aneita Buerger was admitted to COVID 19 unit, he did not had any acute events. He was weaned off oxygen. He is now feeling much better, ambulating with no problems. He reports that his grandmother passed away, his mother had syncopal episode and there is no one to take care of them. He would like to be discharged. COVID 19 infection -  Started on vitamin/antioxidant cocktail  Dexamethasone  remdesivir  Received convaslescent plasma. lovenox 30 units bid. Will discharge on dexamethasone to complete 10 day course. Follow up with PCP and follow up with CT chest in 6 months to follow up on pulmonary nodule. Activity: Activity as tolerated    Diet: Diabetic Diet until on steroids    Follow-up: PCP    Disposition: home    Minutes spent on discharge: 40       Labs: Results:       Chemistry Recent Labs     03/24/21 0435 03/23/21  0830 03/22/21  1040   * 135* 97    142 140   K 4.3 4.6 3.9   * 113* 107   CO2 26 24 25   BUN 17 16 13   CREA 1.24 1.18 1.35*   CA 8.5 9.2 9.4   AGAP 5 5 8   BUCR 14 14 10*   AP 96 104 113   TP 6.2* 6.9 7.2   ALB 3.5 3.7 4.1   GLOB 2.7 3.2 3.1   AGRAT 1.3 1.2 1.3      CBC w/Diff Recent Labs     03/24/21  0435 03/23/21  0830 03/22/21  1040   WBC 13.9* 11.9 6.7   RBC 4.44* 4.97 5.17   HGB 12.5* 13.7 14.5   HCT 38.8 43.1 44.2    345 320   GRANS 82* 90* 55   LYMPH 10* 7* 32   EOS 0 0 1      Cardiac Enzymes Recent Labs     03/22/21  1040   CPK 63   CKND1 CALCULATION NOT PERFORMED WHEN RESULT IS BELOW LINEAR LIMIT      Coagulation No results for input(s): PTP, INR, APTT, INREXT, INREXT in the last 72 hours. Lipid Panel No results found for: CHOL, CHOLPOCT, CHOLX, CHLST, CHOLV, 841686, HDL, HDLP, LDL, LDLC, DLDLP, 621656, VLDLC, VLDL, TGLX, TRIGL, TRIGP, TGLPOCT, CHHD, CHHDX   BNP No results for input(s): BNPP in the last 72 hours.    Liver Enzymes Recent Labs     03/24/21  0435   TP 6.2*   ALB 3.5   AP 96 Thyroid Studies No results found for: T4, T3U, TSH, TSHEXT, TSHEXT         Significant Diagnostic Studies: Cta Chest W Or W Wo Cont    Result Date: 3/22/2021  EXAM: CTA Chest INDICATION: Shortness of breath with Covid infection. COMPARISON: No prior study. TECHNIQUE: Axial CT imaging from the thoracic inlet through the diaphragm with intravenous contrast utilizing CTA study for pulmonary artery evaluation. Coronal and sagittal MIP reformations were generated at a separate workstation. One or more dose reduction techniques were used on this CT: automated exposure control, adjustment of the mAs and/or kVp according to patient size, and iterative reconstruction techniques. The specific techniques used on this CT exam have been documented in the patient's electronic medical record. Digital Imaging and Communications in Medicine (DICOM) format image data are available to nonaffiliated external healthcare facilities or entities on a secure, media free, reciprocally searchable basis with patient authorization for at least a 12-month period after this study. _______________ FINDINGS: EXAM QUALITY: Overall exam quality is adequate. Pulmonary arterial enhancement is adequate. The breath hold is satisfactory. PULMONARY ARTERIES: No convincing evidence of pulmonary embolism. MEDIASTINUM: Included thyroid gland is unremarkable. Cardiac size normal. No evidence of pericardial effusion. Thoracic aorta is normal in course and caliber. LYMPH NODES: No enlarged mediastinal or hilar nodes by size criteria. AIRWAY: Unremarkable. LUNGS: Minor dependent changes of atelectasis. No alveolar consolidation. No significant groundglass opacity or abnormal septal line thickening. No suspicious appearing pulmonary mass. 6 mm peripheral left upper lobe pulmonary nodule (image 23). PLEURA: No pleural effusion or pneumothorax. UPPER ABDOMEN: Visualized upper abdomen is unremarkable. . OTHER: No acute or aggressive osseous abnormalities identified. _______________     1. No evidence of pulmonary embolism. 2.  Well aerated lungs without evidence of pneumonia or pulmonary edema. 3. Left upper lobe pulmonary nodule, potential intrapulmonary lymph node given imaging appearance. Comparison with prior CT imaging recommended if available. If no prior imaging is available, please see below guidelines for proposed follow-up. ======== Fleischner Society pulmonary nodule guidelines (revised 2017): Solid nodule 6-8 mm: -Low risk for lung cancer: CT at 6-12 months, then consider CT at 18-24 months. -High risk for lung cancer: CT at 6-12 months, then CT at 18-24 months. Xr Chest Port    Result Date: 3/22/2021  EXAM: XR CHEST PORT CLINICAL INDICATION/HISTORY: SOB -Additional: None COMPARISON: None TECHNIQUE: Frontal view of the chest _______________ FINDINGS: HEART AND MEDIASTINUM: Cardiac size is upper limits of normal for AP technique. LUNGS AND PLEURAL SPACES: Rotated nature of the projection foreshortening the left hemithorax. No focal pneumonic opacity. No evidence of pneumothorax or pleural effusion. BONY THORAX AND SOFT TISSUES: No acute osseous abnormality _______________     Rotated projection of the chest without superimposed acute abnormality. No results found for this or any previous visit. Please note that this dictation was completed with [employer], the computer voice recognition software. Quite often unanticipated grammatical, syntax, homophones, and other interpretive errors are inadvertently transcribed by the computer software. Please disregard these errors. Please excuse any errors that have escaped final proofreading.      CC: None

## 2021-03-26 ENCOUNTER — PATIENT OUTREACH (OUTPATIENT)
Dept: CASE MANAGEMENT | Age: 36
End: 2021-03-26

## 2021-03-26 NOTE — PROGRESS NOTES
Patient contacted regarding TFYOF-85 diagnosis\". Discussed COVID-19 related testing which was not done at this time. Test results were not done. Patient informed of results, if available? N/A  Patient reports he was previously diagnosed as Covid positive. Care Transition Nurse contacted the patient by telephone to perform post discharge assessment. Call within 2 business days of discharge: Yes Verified name and  with patient as identifiers. Provided introduction to self, and explanation of the CTN/ACM role, and reason for call due to risk factors for infection and/or exposure to COVID-19. Symptoms reviewed with patient who verbalized the following symptoms:  - some lingering chest tightness/shortness of breath  that comes and goes. Patient reports that he was told this may occur for a while. CTN encouraged patient to follow up with PCP/urgent care/ emergency services for any worsening of symptoms. Due to no new or worsening symptoms encounter was not routed to provider for escalation. Patient was encouraged to follow up with referrals provided at time of discharge to establish a relationship with a PCP. Discussed follow-up appointments. If no appointment was previously scheduled, appointment scheduling offered:  vic   [facility] follow up appointment(s): No future appointments. Non-[facility] follow up appointment(s):   PCP     Reviewed  discharge instructions, medical action plan and red flags such shortness of breath, increasing fever and signs of decompensation with patient who verbalized understanding. Discussed exposure protocols and quarantine with CDC Guidelines What to do if you are sick with coronavirus disease .  Patient was given an opportunity for questions and concerns. The patient agrees to contact the [facility] exposure line [phone], [facility] department [facility] [address]  [phone] and PCP office for questions related to their healthcare.  CTN provided contact information for future needs. Reviewed with  patient any  new and changed medications related to discharge diagnosis     Was patient discharged with a pulse oximeter? no Discussed and confirmed pulse oximeter discharge instructions and when to notify provider or seek emergency care: n/a     Patient/family/caregiver given information for [employer] Loop and agrees to enroll yes     Patient's preferred e-mail: [email or web address]      Patient's preferred phone number: n/a     Based on Loop alert triggers, patient will be contacted by nurse care manager for worsening symptoms. Pt will be further monitored by COVID Loop Team based on severity of symptoms and risk factors.

## 2021-03-30 ENCOUNTER — PATIENT OUTREACH (OUTPATIENT)
Dept: CASE MANAGEMENT | Age: 36
End: 2021-03-30

## 2022-03-18 PROBLEM — R06.00 DYSPNEA: Status: ACTIVE | Noted: 2021-03-22

## 2022-03-19 PROBLEM — R91.1 PULMONARY NODULE: Status: ACTIVE | Noted: 2021-03-24

## 2022-03-19 PROBLEM — R09.02 HYPOXIA: Status: ACTIVE | Noted: 2021-03-22

## 2022-03-19 PROBLEM — U07.1 COVID-19: Status: ACTIVE | Noted: 2021-03-22

## 2022-07-25 NOTE — PROGRESS NOTES
Date/Time: 3/30/2021 11:28 AM    E-mail sent via LOOP to encourage activation of account enrollment. E-mail contains contact information for assistance with questions and help with enrollment as necessary. Yes